# Patient Record
Sex: MALE | Race: WHITE | URBAN - METROPOLITAN AREA
[De-identification: names, ages, dates, MRNs, and addresses within clinical notes are randomized per-mention and may not be internally consistent; named-entity substitution may affect disease eponyms.]

---

## 2017-02-17 NOTE — PROGRESS NOTES
History of Present Illness - Ramon Alvarez is a 61 year old male who presents with concerns about sinus symptoms. The main symptom recently has been nasal congestion, and this has been present since 2+ years. Other associated symptoms have included Snoring witnessed apnea. These symptoms have been constant and are very disruptive to the patient's lifestyle. No recent treatments.  A 2 week trial of nasal steroids has not been completed. A burst of oral steroids has not been completed. The patient has no history of sinus surgery. The patient reports has no history for migraine headaches.  He also complains that his symptoms are mostly at night and unilateral varying form side to side depending which side of the body he sleeps on.  He snores and acc to his wife has apneas on occasion.  However, he denies excessive daytime sleepiness with Ramseur of 4, no caffeine use, no napping or weight gain, no frequent awakenings at night.  Past Medical History - There is no problem list on file for this patient.      Current Medications -   Current Outpatient Prescriptions:      CIPROFLOXACIN  MG OR TABS, 1 Tab BID prn traveler's diarrhea, Disp: 10, Rfl: 0    Allergies - No Known Allergies    Social History -   Social History     Social History     Marital status:      Spouse name: N/A     Number of children: N/A     Years of education: N/A     Social History Main Topics     Smoking status: Not on file     Smokeless tobacco: Not on file     Alcohol use Not on file     Drug use: Not on file     Sexual activity: Not on file     Other Topics Concern     Not on file     Social History Narrative       Family History - No family history on file.    Review of Systems - As per HPI and PMHx, otherwise system review of the head and neck negative.    Physical Exam  Vitals: There were no vitals taken for this visit.  BMI= There is no height or weight on file to calculate BMI.    General - The patient is well nourished and  well developed, and appears to have good nutritional status.  Alert and oriented to person and place, answers questions and cooperates with examination appropriately.   Head and Face - Normocephalic and atraumatic, with no gross asymmetry noted of the contour of the facial features.  The facial nerve is intact, with strong symmetric movements.  Voice and Breathing - The patient was breathing comfortably without the use of accessory muscles. There was no wheezing, stridor, or stertor.  The patients voice was clear and strong, and had appropriate pitch and quality.  Ears - Bilateral pinna and EACs with normal appearing overlying skin. Tympanic membrane intact with good mobility on pneumatic otoscopy bilaterally. Bony landmarks of the ossicular chain are normal. The tympanic membranes are normal in appearance. No retraction, perforation, or masses.  No fluid or purulence was seen in the external canal or the middle ear.   Eyes - Extraocular movements intact.  Sclera were not icteric or injected, conjunctiva were pink and moist.  Mouth - Examination of the oral cavity showed pink, healthy oral mucosa. No lesions or ulcerations noted.  The tongue was mobile and midline, and the dentition were in good condition.    Throat - The walls of the oropharynx were smooth, pink, moist, symmetric, and had no lesions or ulcerations.  The tonsillar pillars and soft palate were symmetric.  The uvula was midline on elevation.  Neck - Normal midline excursion of the laryngotracheal complex during swallowing.  Full range of motion on passive movement.  Palpation of the occipital, submental, submandibular, internal jugular chain, and supraclavicular nodes did not demonstrate any abnormal lymph nodes or masses.  The carotid pulse was palpable bilaterally.  Palpation of the thyroid was soft and smooth, with no nodules or goiter appreciated.  The trachea was mobile and midline.  Nose - External contour is symmetric, no gross deflection or  scars.  Nasal mucosa is pink and moist with no abnormal mucus.  The septum was midline and non-obstructive, turbinates of somewhat enlarged sizel.  No polyps, masses, or purulence noted on examination.        ASSESSMENT/PLAN:  Ramon Alvarez is a 61 year old male with obstructive inferior turbinates and snoring.     I encouraged daily nasal saline irrigations and instructed on the proper use of the nasal steroids, which must be taken for at least 2 weeks before expecting to see results.  Will follow up in 2 months.    Kj Fernández MD

## 2017-02-20 ENCOUNTER — OFFICE VISIT (OUTPATIENT)
Dept: OTOLARYNGOLOGY | Facility: CLINIC | Age: 62
End: 2017-02-20
Payer: COMMERCIAL

## 2017-02-20 VITALS — WEIGHT: 236 LBS | OXYGEN SATURATION: 98 % | HEART RATE: 68 BPM | BODY MASS INDEX: 32.92 KG/M2

## 2017-02-20 DIAGNOSIS — J34.3 HYPERTROPHY OF BOTH INFERIOR NASAL TURBINATES: Primary | ICD-10-CM

## 2017-02-20 DIAGNOSIS — R06.83 PRIMARY SNORING: ICD-10-CM

## 2017-02-20 PROCEDURE — 99203 OFFICE O/P NEW LOW 30 MIN: CPT | Performed by: OTOLARYNGOLOGY

## 2017-02-20 NOTE — MR AVS SNAPSHOT
"              After Visit Summary   2017    Ramon Alvarez    MRN: [id number]           Patient Information     Date Of Birth          1955        Visit Information        Provider Department      2017 1:45 PM Kj Fernández MD [facility]         Follow-ups after your visit        Who to contact     If you have questions or need follow up information about today's clinic visit or your schedule please contact [facility] directly at [phone].  Normal or non-critical lab and imaging results will be communicated to you by MyChart, letter or phone within 4 business days after the clinic has received the results. If you do not hear from us within 7 days, please contact the clinic through trivagohart or phone. If you have a critical or abnormal lab result, we will notify you by phone as soon as possible.  Submit refill requests through Goodpatch or call your pharmacy and they will forward the refill request to us. Please allow 3 business days for your refill to be completed.          Additional Information About Your Visit        [facility] Information     Goodpatch lets you send messages to your doctor, view your test results, renew your prescriptions, schedule appointments and more. To sign up, go to [email or web address] . Click on \"Log in\" on the left side of the screen, which will take you to the Welcome page. Then click on \"Sign up Now\" on the right side of the page.     You will be asked to enter the access code listed below, as well as some personal information. Please follow the directions to create your username and password.     Your access code is: [email or web address]  Expires: 2017  2:55 PM     Your access code will  in 90 days. If you need help or a new code, please call your [facility] or [phone].        Care EveryWhere ID     This is your Care EveryWhere ID. This could be used by other organizations to access your [place] medical " records  [id number]        Your Vitals Were     Pulse Pulse Oximetry BMI (Body Mass Index)             68 98% 32.92 kg/m2          Blood Pressure from Last 3 Encounters:   08/19/10 130/80    Weight from Last 3 Encounters:   02/20/17 107 kg (236 lb)   08/19/10 96.6 kg (213 lb)              Today, you had the following     No orders found for display       Primary Care Provider    None Specified       No primary provider on file.        Thank you!     Thank you for choosing [facility]  for your care. Our goal is always to provide you with excellent care. Hearing back from our patients is one way we can continue to improve our services. Please take a few minutes to complete the written survey that you may receive in the mail after your visit with us. Thank you!             Your Updated Medication List - Protect others around you: Learn how to safely use, store and throw away your medicines at www.disposemymeds.org.      Notice  As of 2/20/2017  2:55 PM    You have not been prescribed any medications.

## 2017-02-20 NOTE — NURSING NOTE
"Chief Complaint   Patient presents with     Consult     Sinus Problem       Initial Pulse 68  Wt 107 kg (236 lb)  SpO2 98%  BMI 32.92 kg/m2 Estimated body mass index is 32.92 kg/(m^2) as calculated from the following:    Height as of 8/19/10: 1.803 m (5' 11\").    Weight as of this encounter: 107 kg (236 lb).  Medication Reconciliation: complete  "